# Patient Record
Sex: FEMALE | URBAN - METROPOLITAN AREA
[De-identification: names, ages, dates, MRNs, and addresses within clinical notes are randomized per-mention and may not be internally consistent; named-entity substitution may affect disease eponyms.]

---

## 2020-07-25 ENCOUNTER — TELEPHONE ENCOUNTER (OUTPATIENT)
Dept: URBAN - METROPOLITAN AREA CLINIC 13 | Facility: CLINIC | Age: 70
End: 2020-07-25

## 2020-07-25 RX ORDER — POLYETHYLENE GLYCOL 3350, SODIUM CHLORIDE, SODIUM BICARBONATE AND POTASSIUM CHLORIDE WITH LEMON FLAVOR 420; 11.2; 5.72; 1.48 G/4L; G/4L; G/4L; G/4L
TAKE 1/2 GALLON AT 5:00 PM DAY BEFORE PROCEDURE, TAKE SECOND 1/2 OF GALLON 6 HRS PRIOR TO PROCEDURE POWDER, FOR SOLUTION ORAL
Qty: 1 | Refills: 0 | OUTPATIENT
Start: 2018-09-10 | End: 2018-09-17

## 2020-07-25 RX ORDER — FERROUS SULFATE 325(65) MG
TAKE 1 TABLET DAILY TABLET ORAL
Qty: 30 | Refills: 3 | OUTPATIENT
Start: 2018-05-08 | End: 2018-10-04

## 2020-07-26 ENCOUNTER — TELEPHONE ENCOUNTER (OUTPATIENT)
Dept: URBAN - METROPOLITAN AREA CLINIC 13 | Facility: CLINIC | Age: 70
End: 2020-07-26

## 2020-07-26 RX ORDER — RABEPRAZOLE SODIUM 20 MG/1
TAKE 1 TABLET BY MOUTH EVERY DAY TABLET, DELAYED RELEASE ORAL
Qty: 90 | Refills: 3 | Status: ACTIVE | COMMUNITY
Start: 2018-03-21

## 2020-07-26 RX ORDER — METRONIDAZOLE 7.5 MG/G
APPLY TO AFFECTED AREA EVERY DAY LOTION TOPICAL
Qty: 177 | Refills: 0 | Status: ACTIVE | COMMUNITY
Start: 2017-10-05

## 2020-07-26 RX ORDER — LISINOPRIL 20 MG/1
TAKE 1 TABLET DAILY AS DIRECTED TABLET ORAL
Refills: 0 | Status: ACTIVE | COMMUNITY

## 2020-07-26 RX ORDER — AMOXICILLIN AND CLAVULANATE POTASSIUM 875; 125 MG/1; MG/1
TAKE 1 TABLET BY MOUTH EVERY 12 HOURS FOR 10 DAYS TABLET, FILM COATED ORAL
Qty: 20 | Refills: 0 | Status: ACTIVE | COMMUNITY
Start: 2017-12-29

## 2020-08-11 ENCOUNTER — TELEPHONE ENCOUNTER (OUTPATIENT)
Dept: URBAN - METROPOLITAN AREA CLINIC 113 | Facility: CLINIC | Age: 70
End: 2020-08-11

## 2020-08-26 ENCOUNTER — OFFICE VISIT (OUTPATIENT)
Dept: URBAN - METROPOLITAN AREA CLINIC 113 | Facility: CLINIC | Age: 70
End: 2020-08-26
Payer: MEDICARE

## 2020-08-26 VITALS
TEMPERATURE: 97.7 F | RESPIRATION RATE: 20 BRPM | BODY MASS INDEX: 27.16 KG/M2 | DIASTOLIC BLOOD PRESSURE: 73 MMHG | HEART RATE: 65 BPM | HEIGHT: 65 IN | SYSTOLIC BLOOD PRESSURE: 159 MMHG | WEIGHT: 163 LBS

## 2020-08-26 DIAGNOSIS — D50.0 IRON DEFICIENCY ANEMIA DUE TO CHRONIC BLOOD LOSS: ICD-10-CM

## 2020-08-26 DIAGNOSIS — K44.9 HIATAL HERNIA: ICD-10-CM

## 2020-08-26 DIAGNOSIS — K21.9 GASTROESOPHAGEAL REFLUX DISEASE WITHOUT ESOPHAGITIS: ICD-10-CM

## 2020-08-26 PROCEDURE — 99213 OFFICE O/P EST LOW 20 MIN: CPT | Performed by: INTERNAL MEDICINE

## 2020-08-26 RX ORDER — RABEPRAZOLE SODIUM 20 MG/1
TAKE 1 TABLET BY MOUTH EVERY DAY TABLET, DELAYED RELEASE ORAL
Qty: 90 | Refills: 3 | Status: ACTIVE | COMMUNITY
Start: 2018-03-21

## 2020-08-26 RX ORDER — LISINOPRIL 20 MG/1
TAKE 1 TABLET DAILY AS DIRECTED TABLET ORAL
Refills: 0 | Status: ACTIVE | COMMUNITY

## 2020-08-26 RX ORDER — LEVOTHYROXINE SODIUM 125 UG/1
1 TABLET IN THE MORNING ON AN EMPTY STOMACH TABLET ORAL ONCE A DAY
Status: ACTIVE | COMMUNITY

## 2020-08-26 RX ORDER — FERROUS GLUCONATE 256(28)MG
1 TABLET WITH WATER OR JUICE BETWEEN MEALS TABLET ORAL ONCE A DAY
Qty: 90 TABLET | Refills: 2 | OUTPATIENT
Start: 2020-08-26

## 2020-08-26 RX ORDER — METRONIDAZOLE 7.5 MG/G
APPLY TO AFFECTED AREA EVERY DAY LOTION TOPICAL
Qty: 177 | Refills: 0 | Status: DISCONTINUED | COMMUNITY
Start: 2017-10-05

## 2020-08-26 RX ORDER — AMOXICILLIN AND CLAVULANATE POTASSIUM 875; 125 MG/1; MG/1
TAKE 1 TABLET BY MOUTH EVERY 12 HOURS FOR 10 DAYS TABLET, FILM COATED ORAL
Qty: 20 | Refills: 0 | Status: DISCONTINUED | COMMUNITY
Start: 2017-12-29

## 2020-08-26 NOTE — EXAM-PHYSICAL EXAM
She is alert and oriented to person place and situation in no acute distress.  There is no scleral icterus.  She does have a mild pale appearance.

## 2020-08-26 NOTE — HPI-TODAY'S VISIT:
The patient is a very pleasant 70-year-old female who I followed for iron deficiency anemia secondary to a large hiatal hernia.  She presented 2 years ago with this and was found to have a very large hiatal hernia.  Colonoscopy was essentially unremarkable.  She is long since discontinued her iron but continues on her daily proton pump inhibitor.  Recently she started developing dyspnea on exertion.  She is undergone a pulmonary evaluation but her primary care physician checked her hemoglobin and noted it to drop down to 10.  She was taking nonsteroidal anti-inflammatory agents for knee pain and she is scheduled to have a total knee replacement of the right side soon.  She denies any other gastrointestinal symptoms.  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  She states this is monitored routinely by her vascular surgeon.

## 2020-08-31 ENCOUNTER — TELEPHONE ENCOUNTER (OUTPATIENT)
Dept: URBAN - METROPOLITAN AREA CLINIC 113 | Facility: CLINIC | Age: 70
End: 2020-08-31

## 2020-08-31 ENCOUNTER — LAB OUTSIDE AN ENCOUNTER (OUTPATIENT)
Dept: URBAN - METROPOLITAN AREA CLINIC 113 | Facility: CLINIC | Age: 70
End: 2020-08-31

## 2020-09-01 ENCOUNTER — TELEPHONE ENCOUNTER (OUTPATIENT)
Dept: URBAN - METROPOLITAN AREA CLINIC 113 | Facility: CLINIC | Age: 70
End: 2020-09-01

## 2020-09-01 RX ORDER — CHOLECALCIFEROL (VITAMIN D3) 125 MCG
1 TABLET CAPSULE ORAL ONCE A DAY
Qty: 30 TABLET | Refills: 2 | OUTPATIENT
Start: 2020-09-01

## 2020-09-01 RX ORDER — LISINOPRIL 20 MG/1
TAKE 1 TABLET DAILY AS DIRECTED TABLET ORAL
Refills: 0 | Status: ACTIVE | COMMUNITY

## 2020-09-01 RX ORDER — LEVOTHYROXINE SODIUM 125 UG/1
1 TABLET IN THE MORNING ON AN EMPTY STOMACH TABLET ORAL ONCE A DAY
Status: ACTIVE | COMMUNITY

## 2020-09-01 RX ORDER — RABEPRAZOLE SODIUM 20 MG/1
TAKE 1 TABLET BY MOUTH EVERY DAY TABLET, DELAYED RELEASE ORAL
Qty: 90 | Refills: 3 | Status: ACTIVE | COMMUNITY
Start: 2018-03-21

## 2020-09-01 RX ORDER — FERROUS GLUCONATE 256(28)MG
1 TABLET WITH WATER OR JUICE BETWEEN MEALS TABLET ORAL ONCE A DAY
Qty: 90 TABLET | Refills: 2 | Status: ACTIVE | COMMUNITY
Start: 2020-08-26

## 2020-10-28 ENCOUNTER — WEB ENCOUNTER (OUTPATIENT)
Dept: URBAN - METROPOLITAN AREA CLINIC 113 | Facility: CLINIC | Age: 70
End: 2020-10-28

## 2020-10-28 ENCOUNTER — OFFICE VISIT (OUTPATIENT)
Dept: URBAN - METROPOLITAN AREA CLINIC 113 | Facility: CLINIC | Age: 70
End: 2020-10-28
Payer: MEDICARE

## 2020-10-28 VITALS
HEIGHT: 65 IN | TEMPERATURE: 98.2 F | HEART RATE: 68 BPM | WEIGHT: 166 LBS | SYSTOLIC BLOOD PRESSURE: 128 MMHG | DIASTOLIC BLOOD PRESSURE: 60 MMHG | BODY MASS INDEX: 27.66 KG/M2

## 2020-10-28 DIAGNOSIS — D50.0 IRON DEFICIENCY ANEMIA DUE TO CHRONIC BLOOD LOSS: ICD-10-CM

## 2020-10-28 DIAGNOSIS — K44.9 HIATAL HERNIA: ICD-10-CM

## 2020-10-28 DIAGNOSIS — K21.9 GASTROESOPHAGEAL REFLUX DISEASE WITHOUT ESOPHAGITIS: ICD-10-CM

## 2020-10-28 PROCEDURE — 99213 OFFICE O/P EST LOW 20 MIN: CPT | Performed by: INTERNAL MEDICINE

## 2020-10-28 RX ORDER — LEVOTHYROXINE SODIUM 125 UG/1
1 TABLET IN THE MORNING ON AN EMPTY STOMACH TABLET ORAL ONCE A DAY
Status: ACTIVE | COMMUNITY

## 2020-10-28 RX ORDER — LISINOPRIL 20 MG/1
TAKE 1 TABLET DAILY AS DIRECTED TABLET ORAL
Refills: 0 | Status: ACTIVE | COMMUNITY

## 2020-10-28 RX ORDER — RIVAROXABAN 10 MG/1
1 TABLET TABLET, FILM COATED ORAL
Status: ACTIVE | COMMUNITY

## 2020-10-28 RX ORDER — RABEPRAZOLE SODIUM 20 MG/1
TAKE 1 TABLET BY MOUTH EVERY DAY TABLET, DELAYED RELEASE ORAL
Qty: 90 | Refills: 3 | Status: ACTIVE | COMMUNITY
Start: 2018-03-21

## 2020-10-28 RX ORDER — CHOLECALCIFEROL (VITAMIN D3) 125 MCG
1 TABLET CAPSULE ORAL
Refills: 2 | Status: ACTIVE | COMMUNITY
Start: 2020-09-01

## 2020-10-28 RX ORDER — FERROUS GLUCONATE 256(28)MG
1 TABLET WITH WATER OR JUICE BETWEEN MEALS TABLET ORAL
Refills: 2 | Status: ON HOLD | COMMUNITY
Start: 2020-08-26

## 2020-10-28 NOTE — EXAM-PHYSICAL EXAM
She is alert and oriented to person place and situation in no acute distress.  She has no scleral icterus.

## 2020-10-28 NOTE — HPI-TODAY'S VISIT:
The patient is a very pleasant 70-year-old female who was referred to me 2 years ago for iron deficiency anemia.  Upper and lower endoscopy revealed a very large hiatal hernia with erosive esophagitis.  She was recently referred back because of falling hemoglobin.  She was off iron.  She is now back on iron.  Hemoglobin had drifted down to 9 but with iron her hemoglobin increased to 10.6.  I had a long discussion with the patient in regards to medical versus surgical management.

## 2020-11-22 ENCOUNTER — ERX REFILL RESPONSE (OUTPATIENT)
Dept: URBAN - METROPOLITAN AREA CLINIC 113 | Facility: CLINIC | Age: 70
End: 2020-11-22

## 2020-11-22 RX ORDER — FERROUS SULFATE 325(65) MG
1 TABLET TABLET ORAL ONCE A DAY
Qty: 90 | Refills: 0

## 2021-04-29 ENCOUNTER — OFFICE VISIT (OUTPATIENT)
Dept: URBAN - METROPOLITAN AREA CLINIC 113 | Facility: CLINIC | Age: 71
End: 2021-04-29

## 2021-05-20 ENCOUNTER — OFFICE VISIT (OUTPATIENT)
Dept: URBAN - METROPOLITAN AREA CLINIC 113 | Facility: CLINIC | Age: 71
End: 2021-05-20
Payer: MEDICARE

## 2021-05-20 VITALS
HEIGHT: 65 IN | TEMPERATURE: 97.3 F | WEIGHT: 166 LBS | BODY MASS INDEX: 27.66 KG/M2 | SYSTOLIC BLOOD PRESSURE: 128 MMHG | DIASTOLIC BLOOD PRESSURE: 64 MMHG | HEART RATE: 64 BPM

## 2021-05-20 DIAGNOSIS — K44.9 HIATAL HERNIA: ICD-10-CM

## 2021-05-20 DIAGNOSIS — K21.9 GASTROESOPHAGEAL REFLUX DISEASE WITHOUT ESOPHAGITIS: ICD-10-CM

## 2021-05-20 DIAGNOSIS — D50.0 IRON DEFICIENCY ANEMIA DUE TO CHRONIC BLOOD LOSS: ICD-10-CM

## 2021-05-20 PROCEDURE — 99213 OFFICE O/P EST LOW 20 MIN: CPT | Performed by: INTERNAL MEDICINE

## 2021-05-20 RX ORDER — FERROUS SULFATE 325(65) MG
1 TABLET TABLET ORAL ONCE A DAY
Qty: 90 | Refills: 0 | Status: ACTIVE | COMMUNITY

## 2021-05-20 RX ORDER — LISINOPRIL 20 MG/1
TAKE 1 TABLET DAILY AS DIRECTED TABLET ORAL
Refills: 0 | Status: ACTIVE | COMMUNITY

## 2021-05-20 RX ORDER — LEVOTHYROXINE SODIUM 100 UG/1
1 TABLET IN THE MORNING ON AN EMPTY STOMACH TABLET ORAL ONCE A DAY
Status: ACTIVE | COMMUNITY

## 2021-05-20 RX ORDER — FERROUS GLUCONATE 256(28)MG
1 TABLET WITH WATER OR JUICE BETWEEN MEALS TABLET ORAL
Refills: 2 | Status: ON HOLD | COMMUNITY
Start: 2020-08-26

## 2021-05-20 RX ORDER — RABEPRAZOLE SODIUM 20 MG/1
TAKE 1 TABLET BY MOUTH EVERY DAY TABLET, DELAYED RELEASE ORAL
Qty: 90 | Refills: 3 | Status: ACTIVE | COMMUNITY
Start: 2018-03-21

## 2021-05-20 RX ORDER — RIVAROXABAN 10 MG/1
1 TABLET TABLET, FILM COATED ORAL
Status: ON HOLD | COMMUNITY

## 2021-05-20 NOTE — HPI-TODAY'S VISIT:
The patient is a very pleasant 70-year-old female who was referred to me 2 years ago for iron deficiency anemia.  Upper and lower endoscopy revealed a very large hiatal hernia with erosive esophagitis.  She was recently referred back because of falling hemoglobin.  She was off iron.  She is now back on iron.  Hemoglobin had drifted down to 9 but with iron her hemoglobin increased to 10.6.  I had a long discussion with the patient in regards to medical versus surgical management. The patient is doing quite well.  She continues on iron 3 times a week.  She states she has not had blood counts done since last fall.  She did have a knee surgery last fall.  She states her heartburn is under good control with her rabeprazole.

## 2021-05-21 ENCOUNTER — TELEPHONE ENCOUNTER (OUTPATIENT)
Dept: URBAN - METROPOLITAN AREA CLINIC 113 | Facility: CLINIC | Age: 71
End: 2021-05-21

## 2021-05-21 LAB
BASO (ABSOLUTE): 0.1
BASOS: 1
EOS (ABSOLUTE): 0.2
EOS: 4
FERRITIN, SERUM: 59
HEMATOCRIT: 41
HEMATOLOGY COMMENTS:: (no result)
HEMOGLOBIN: 13.9
IMMATURE CELLS: (no result)
IMMATURE GRANS (ABS): 0
IMMATURE GRANULOCYTES: 0
IRON BIND.CAP.(TIBC): 341
IRON SATURATION: 41
IRON: 140
LYMPHS (ABSOLUTE): 2
LYMPHS: 36
MCH: 31.6
MCHC: 33.9
MCV: 93
MONOCYTES(ABSOLUTE): 0.5
MONOCYTES: 8
NEUTROPHILS (ABSOLUTE): 2.8
NEUTROPHILS: 51
NRBC: (no result)
PLATELETS: 274
RBC: 4.4
RDW: 12.1
UIBC: 201
WBC: 5.6

## 2022-03-29 ENCOUNTER — TELEPHONE ENCOUNTER (OUTPATIENT)
Dept: URBAN - METROPOLITAN AREA CLINIC 113 | Facility: CLINIC | Age: 72
End: 2022-03-29

## 2022-03-29 ENCOUNTER — LAB OUTSIDE AN ENCOUNTER (OUTPATIENT)
Dept: URBAN - METROPOLITAN AREA CLINIC 113 | Facility: CLINIC | Age: 72
End: 2022-03-29

## 2022-04-08 ENCOUNTER — TELEPHONE ENCOUNTER (OUTPATIENT)
Dept: URBAN - METROPOLITAN AREA CLINIC 40 | Facility: CLINIC | Age: 72
End: 2022-04-08

## 2022-04-18 ENCOUNTER — CLAIMS CREATED FROM THE CLAIM WINDOW (OUTPATIENT)
Dept: URBAN - METROPOLITAN AREA MEDICAL CENTER 2 | Facility: MEDICAL CENTER | Age: 72
End: 2022-04-18
Payer: MEDICARE

## 2022-04-18 DIAGNOSIS — K44.9 DIAPHRAGMATIC HERNIA WITHOUT OBSTRUCTION OR GANGRENE: ICD-10-CM

## 2022-04-18 DIAGNOSIS — K21.9 GASTRO-ESOPHAGEAL REFLUX DISEASE WITHOUT ESOPHAGITIS: ICD-10-CM

## 2022-04-18 PROCEDURE — 91010 ESOPHAGUS MOTILITY STUDY: CPT | Performed by: INTERNAL MEDICINE

## 2022-05-12 ENCOUNTER — TELEPHONE ENCOUNTER (OUTPATIENT)
Dept: URBAN - METROPOLITAN AREA CLINIC 113 | Facility: CLINIC | Age: 72
End: 2022-05-12

## 2022-05-17 ENCOUNTER — OFFICE VISIT (OUTPATIENT)
Dept: URBAN - METROPOLITAN AREA CLINIC 107 | Facility: CLINIC | Age: 72
End: 2022-05-17
Payer: MEDICARE

## 2022-05-17 VITALS
BODY MASS INDEX: 27.49 KG/M2 | TEMPERATURE: 98.5 F | SYSTOLIC BLOOD PRESSURE: 134 MMHG | HEIGHT: 65 IN | HEART RATE: 78 BPM | RESPIRATION RATE: 18 BRPM | WEIGHT: 165 LBS | DIASTOLIC BLOOD PRESSURE: 72 MMHG

## 2022-05-17 DIAGNOSIS — K44.9 HIATAL HERNIA: ICD-10-CM

## 2022-05-17 DIAGNOSIS — Z86.010 HISTORY OF COLON POLYPS: ICD-10-CM

## 2022-05-17 DIAGNOSIS — K21.9 GASTROESOPHAGEAL REFLUX DISEASE WITHOUT ESOPHAGITIS: ICD-10-CM

## 2022-05-17 DIAGNOSIS — D50.0 IRON DEFICIENCY ANEMIA DUE TO CHRONIC BLOOD LOSS: ICD-10-CM

## 2022-05-17 DIAGNOSIS — R10.13 EPIGASTRIC PAIN: ICD-10-CM

## 2022-05-17 PROBLEM — 428283002: Status: ACTIVE | Noted: 2022-05-17

## 2022-05-17 PROCEDURE — 99214 OFFICE O/P EST MOD 30 MIN: CPT | Performed by: INTERNAL MEDICINE

## 2022-05-17 RX ORDER — FERROUS SULFATE 325(65) MG
1 TABLET TABLET ORAL
Refills: 0 | Status: ACTIVE | COMMUNITY

## 2022-05-17 RX ORDER — LEVOTHYROXINE SODIUM 100 UG/1
1 TABLET IN THE MORNING ON AN EMPTY STOMACH TABLET ORAL ONCE A DAY
Status: ACTIVE | COMMUNITY

## 2022-05-17 RX ORDER — RABEPRAZOLE SODIUM 20 MG/1
TAKE 1 TABLET BY MOUTH EVERY DAY TABLET, DELAYED RELEASE ORAL
Qty: 90 | Refills: 3 | Status: ACTIVE | COMMUNITY
Start: 2018-03-21

## 2022-05-17 RX ORDER — LISINOPRIL 20 MG/1
TAKE 1 TABLET DAILY AS DIRECTED TABLET ORAL
Refills: 0 | Status: ACTIVE | COMMUNITY

## 2022-05-17 NOTE — HPI-TODAY'S VISIT:
The patient is a very pleasant 70-year-old female who was referred to me 2 years ago for iron deficiency anemia.  Upper and lower endoscopy revealed a very large hiatal hernia with erosive esophagitis.  She was recently referred back because of falling hemoglobin.  She was off iron.  She is now back on iron.  Hemoglobin had drifted down to 9 but with iron her hemoglobin increased to 10.6.  I had a long discussion with the patient in regards to medical versus surgical management. The patient is doing quite well.  She continues on iron 3 times a week.  She states she has not had blood counts done since last fall.  She did have a knee surgery last fall.  She states her heartburn is under good control with her rabeprazole. Interval history.  Esophageal manometry was performed on April 11 of this year.  This did reveal significant dysmotility but no evidence for achalasia.  90% of swallows were weak to ineffective.  Peristalsis though was clearly present. The patient states over the last few months she has developed a postprandial tightness in the epigastric region and chest.  She also gets it when she bends over.  She has sought a cardiac evaluation.  She is in the middle of this.  She states an echocardiogram is planned.  They did a EKG.

## 2024-02-19 ENCOUNTER — LAB (OUTPATIENT)
Dept: URBAN - METROPOLITAN AREA CLINIC 113 | Facility: CLINIC | Age: 74
End: 2024-02-19

## 2024-02-19 ENCOUNTER — OV EP (OUTPATIENT)
Dept: URBAN - METROPOLITAN AREA CLINIC 113 | Facility: CLINIC | Age: 74
End: 2024-02-19
Payer: MEDICARE

## 2024-02-19 VITALS
DIASTOLIC BLOOD PRESSURE: 78 MMHG | HEART RATE: 72 BPM | SYSTOLIC BLOOD PRESSURE: 130 MMHG | RESPIRATION RATE: 18 BRPM | WEIGHT: 173 LBS | TEMPERATURE: 98.1 F | BODY MASS INDEX: 28.82 KG/M2 | HEIGHT: 65 IN

## 2024-02-19 DIAGNOSIS — K21.9 GASTROESOPHAGEAL REFLUX DISEASE WITHOUT ESOPHAGITIS: ICD-10-CM

## 2024-02-19 DIAGNOSIS — D50.0 IRON DEFICIENCY ANEMIA DUE TO CHRONIC BLOOD LOSS: ICD-10-CM

## 2024-02-19 DIAGNOSIS — I72.8: ICD-10-CM

## 2024-02-19 DIAGNOSIS — Z86.010 HISTORY OF COLON POLYPS: ICD-10-CM

## 2024-02-19 DIAGNOSIS — K44.9 HIATAL HERNIA: ICD-10-CM

## 2024-02-19 PROBLEM — 58780002: Status: ACTIVE | Noted: 2024-02-19

## 2024-02-19 PROCEDURE — 99213 OFFICE O/P EST LOW 20 MIN: CPT | Performed by: INTERNAL MEDICINE

## 2024-02-19 RX ORDER — LEVOTHYROXINE SODIUM 100 UG/1
1 TABLET IN THE MORNING ON AN EMPTY STOMACH TABLET ORAL ONCE A DAY
Status: ACTIVE | COMMUNITY

## 2024-02-19 RX ORDER — LISINOPRIL 20 MG/1
TAKE 1 TABLET DAILY AS DIRECTED TABLET ORAL
Refills: 0 | Status: ACTIVE | COMMUNITY

## 2024-02-19 RX ORDER — FERROUS SULFATE 325(65) MG
1 TABLET TABLET ORAL
Refills: 0 | Status: DISCONTINUED | COMMUNITY

## 2024-02-19 RX ORDER — RABEPRAZOLE SODIUM 20 MG/1
TAKE 1 TABLET BY MOUTH EVERY DAY TABLET, DELAYED RELEASE ORAL
Qty: 90 | Refills: 3 | Status: DISCONTINUED | COMMUNITY
Start: 2018-03-21

## 2024-02-19 NOTE — HPI-TODAY'S VISIT:
The patient is a very pleasant 70-year-old female who was referred to me 2 years ago for iron deficiency anemia.  Upper and lower endoscopy revealed a very large hiatal hernia with erosive esophagitis.  She was recently referred back because of falling hemoglobin.  She was off iron.  She is now back on iron.  Hemoglobin had drifted down to 9 but with iron her hemoglobin increased to 10.6.  I had a long discussion with the patient in regards to medical versus surgical management. The patient is doing quite well.  She continues on iron 3 times a week.  She states she has not had blood counts done since last fall.  She did have a knee surgery last fall.  She states her heartburn is under good control with her rabeprazole. Interval history.  Esophageal manometry was performed on April 11 of this year.  This did reveal significant dysmotility but no evidence for achalasia.  90% of swallows were weak to ineffective.  Peristalsis though was clearly present. The patient states over the last few months she has developed a postprandial tightness in the epigastric region and chest.  She also gets it when she bends over.  She has sought a cardiac evaluation.  She is in the middle of this.  She states an echocardiogram is planned.  They did a EKG. Interval history, 2/19/2024.  CT scan of the chest without contrast performed January 2024 revealed mild bilateral pulmonary linear scarring, thoracic spondylosis and kyphosis.  CT angiogram performed also in January 2024 revealed stable thrombosed calcified splenic artery aneurysm.  Laboratory testing from September 2023 revealed a normal CBC except for slight increased eosinophils at 3.3%. Patient underwent laparoscopic repair of recurrent diaphragmatic hernia in September 2022. The patient states that after surgery she had an immediate complication and had to go for reoperation and spent some time in the hospital.  She has now been left with her chronic dyspnea on exertion.  She has no reflux symptoms and is not currently on a acid reflux medication.  She states she is undergone extensive cardio and pulmonary evaluations for her dyspnea on exertion without any significant findings.  She

## 2024-04-26 ENCOUNTER — COLON (OUTPATIENT)
Dept: URBAN - METROPOLITAN AREA SURGERY CENTER 25 | Facility: SURGERY CENTER | Age: 74
End: 2024-04-26

## 2024-04-26 RX ORDER — LEVOTHYROXINE SODIUM 100 UG/1
1 TABLET IN THE MORNING ON AN EMPTY STOMACH TABLET ORAL ONCE A DAY
Status: ACTIVE | COMMUNITY

## 2024-04-26 RX ORDER — LISINOPRIL 20 MG/1
TAKE 1 TABLET DAILY AS DIRECTED TABLET ORAL
Refills: 0 | Status: ACTIVE | COMMUNITY

## 2024-06-21 ENCOUNTER — OFFICE VISIT (OUTPATIENT)
Dept: URBAN - METROPOLITAN AREA CLINIC 107 | Facility: CLINIC | Age: 74
End: 2024-06-21